# Patient Record
Sex: FEMALE | Race: WHITE | NOT HISPANIC OR LATINO | URBAN - METROPOLITAN AREA
[De-identification: names, ages, dates, MRNs, and addresses within clinical notes are randomized per-mention and may not be internally consistent; named-entity substitution may affect disease eponyms.]

---

## 2019-02-21 ENCOUNTER — EMERGENCY (EMERGENCY)
Facility: HOSPITAL | Age: 84
LOS: 1 days | Discharge: HOME | End: 2019-02-21
Attending: EMERGENCY MEDICINE
Payer: MEDICARE

## 2019-02-21 VITALS
RESPIRATION RATE: 16 BRPM | DIASTOLIC BLOOD PRESSURE: 98 MMHG | OXYGEN SATURATION: 98 % | SYSTOLIC BLOOD PRESSURE: 171 MMHG | HEART RATE: 74 BPM

## 2019-02-21 VITALS
HEIGHT: 62 IN | SYSTOLIC BLOOD PRESSURE: 146 MMHG | HEART RATE: 90 BPM | DIASTOLIC BLOOD PRESSURE: 86 MMHG | WEIGHT: 110.01 LBS | RESPIRATION RATE: 20 BRPM | OXYGEN SATURATION: 96 % | TEMPERATURE: 98 F

## 2019-02-21 VITALS
DIASTOLIC BLOOD PRESSURE: 102 MMHG | OXYGEN SATURATION: 99 % | HEART RATE: 79 BPM | SYSTOLIC BLOOD PRESSURE: 203 MMHG | RESPIRATION RATE: 15 BRPM

## 2019-02-21 DIAGNOSIS — S81.819D: ICD-10-CM

## 2019-02-21 DIAGNOSIS — I10 ESSENTIAL (PRIMARY) HYPERTENSION: ICD-10-CM

## 2019-02-21 DIAGNOSIS — G30.9 ALZHEIMER'S DISEASE, UNSPECIFIED: ICD-10-CM
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
## 2019-02-21 DIAGNOSIS — I48.2 CHRONIC ATRIAL FIBRILLATION: ICD-10-CM

## 2019-02-21 LAB
ALBUMIN SERPL ELPH-MCNC: 4.1 G/DL — SIGNIFICANT CHANGE UP (ref 3.3–5.2)
ALP SERPL-CCNC: 119 U/L — SIGNIFICANT CHANGE UP (ref 40–120)
ALT FLD-CCNC: 12 U/L — SIGNIFICANT CHANGE UP
ANION GAP SERPL CALC-SCNC: 13 MMOL/L — SIGNIFICANT CHANGE UP (ref 5–17)
APTT BLD: 34.5 SEC — SIGNIFICANT CHANGE UP (ref 27.5–36.3)
AST SERPL-CCNC: 21 U/L — SIGNIFICANT CHANGE UP
BASOPHILS # BLD AUTO: 0 K/UL — SIGNIFICANT CHANGE UP (ref 0–0.2)
BASOPHILS NFR BLD AUTO: 0.8 % — SIGNIFICANT CHANGE UP (ref 0–2)
BILIRUB SERPL-MCNC: 0.9 MG/DL — SIGNIFICANT CHANGE UP (ref 0.4–2)
BLD GP AB SCN SERPL QL: SIGNIFICANT CHANGE UP
BUN SERPL-MCNC: 23 MG/DL — HIGH (ref 8–20)
CALCIUM SERPL-MCNC: 9.4 MG/DL — SIGNIFICANT CHANGE UP (ref 8.6–10.2)
CHLORIDE SERPL-SCNC: 103 MMOL/L — SIGNIFICANT CHANGE UP (ref 98–107)
CO2 SERPL-SCNC: 26 MMOL/L — SIGNIFICANT CHANGE UP (ref 22–29)
CREAT SERPL-MCNC: 0.76 MG/DL — SIGNIFICANT CHANGE UP (ref 0.5–1.3)
EOSINOPHIL # BLD AUTO: 0.2 K/UL — SIGNIFICANT CHANGE UP (ref 0–0.5)
EOSINOPHIL NFR BLD AUTO: 2.5 % — SIGNIFICANT CHANGE UP (ref 0–6)
GLUCOSE SERPL-MCNC: 93 MG/DL — SIGNIFICANT CHANGE UP (ref 70–115)
HCT VFR BLD CALC: 43 % — SIGNIFICANT CHANGE UP (ref 37–47)
HGB BLD-MCNC: 14.6 G/DL — SIGNIFICANT CHANGE UP (ref 12–16)
INR BLD: 2.54 RATIO — HIGH (ref 0.88–1.16)
LIDOCAIN IGE QN: 67 U/L — HIGH (ref 22–51)
LYMPHOCYTES # BLD AUTO: 1.7 K/UL — SIGNIFICANT CHANGE UP (ref 1–4.8)
LYMPHOCYTES # BLD AUTO: 27.1 % — SIGNIFICANT CHANGE UP (ref 20–55)
MCHC RBC-ENTMCNC: 32.3 PG — HIGH (ref 27–31)
MCHC RBC-ENTMCNC: 34 G/DL — SIGNIFICANT CHANGE UP (ref 32–36)
MCV RBC AUTO: 95.1 FL — SIGNIFICANT CHANGE UP (ref 81–99)
MONOCYTES # BLD AUTO: 0.5 K/UL — SIGNIFICANT CHANGE UP (ref 0–0.8)
MONOCYTES NFR BLD AUTO: 7.8 % — SIGNIFICANT CHANGE UP (ref 3–10)
NEUTROPHILS # BLD AUTO: 3.9 K/UL — SIGNIFICANT CHANGE UP (ref 1.8–8)
NEUTROPHILS NFR BLD AUTO: 61.5 % — SIGNIFICANT CHANGE UP (ref 37–73)
PLATELET # BLD AUTO: 216 K/UL — SIGNIFICANT CHANGE UP (ref 150–400)
POTASSIUM SERPL-MCNC: 4 MMOL/L — SIGNIFICANT CHANGE UP (ref 3.5–5.3)
POTASSIUM SERPL-SCNC: 4 MMOL/L — SIGNIFICANT CHANGE UP (ref 3.5–5.3)
PROT SERPL-MCNC: 7.2 G/DL — SIGNIFICANT CHANGE UP (ref 6.6–8.7)
PROTHROM AB SERPL-ACNC: 30.1 SEC — HIGH (ref 10–12.9)
RBC # BLD: 4.52 M/UL — SIGNIFICANT CHANGE UP (ref 4.4–5.2)
RBC # FLD: 13.7 % — SIGNIFICANT CHANGE UP (ref 11–15.6)
SODIUM SERPL-SCNC: 142 MMOL/L — SIGNIFICANT CHANGE UP (ref 135–145)
TYPE + AB SCN PNL BLD: SIGNIFICANT CHANGE UP
WBC # BLD: 6.4 K/UL — SIGNIFICANT CHANGE UP (ref 4.8–10.8)
WBC # FLD AUTO: 6.4 K/UL — SIGNIFICANT CHANGE UP (ref 4.8–10.8)

## 2019-02-21 PROCEDURE — 71045 X-RAY EXAM CHEST 1 VIEW: CPT

## 2019-02-21 PROCEDURE — 72125 CT NECK SPINE W/O DYE: CPT | Mod: 26

## 2019-02-21 PROCEDURE — 70450 CT HEAD/BRAIN W/O DYE: CPT

## 2019-02-21 PROCEDURE — 99284 EMERGENCY DEPT VISIT MOD MDM: CPT

## 2019-02-21 PROCEDURE — 86901 BLOOD TYPING SEROLOGIC RH(D): CPT

## 2019-02-21 PROCEDURE — 72125 CT NECK SPINE W/O DYE: CPT

## 2019-02-21 PROCEDURE — 93010 ELECTROCARDIOGRAM REPORT: CPT

## 2019-02-21 PROCEDURE — 83605 ASSAY OF LACTIC ACID: CPT

## 2019-02-21 PROCEDURE — 76377 3D RENDER W/INTRP POSTPROCES: CPT | Mod: 26

## 2019-02-21 PROCEDURE — 72170 X-RAY EXAM OF PELVIS: CPT | Mod: 26

## 2019-02-21 PROCEDURE — 85610 PROTHROMBIN TIME: CPT

## 2019-02-21 PROCEDURE — 86900 BLOOD TYPING SEROLOGIC ABO: CPT

## 2019-02-21 PROCEDURE — 93005 ELECTROCARDIOGRAM TRACING: CPT

## 2019-02-21 PROCEDURE — 86850 RBC ANTIBODY SCREEN: CPT

## 2019-02-21 PROCEDURE — 71045 X-RAY EXAM CHEST 1 VIEW: CPT | Mod: 26

## 2019-02-21 PROCEDURE — 85730 THROMBOPLASTIN TIME PARTIAL: CPT

## 2019-02-21 PROCEDURE — 72192 CT PELVIS W/O DYE: CPT | Mod: 26

## 2019-02-21 PROCEDURE — 96374 THER/PROPH/DIAG INJ IV PUSH: CPT

## 2019-02-21 PROCEDURE — 72170 X-RAY EXAM OF PELVIS: CPT

## 2019-02-21 PROCEDURE — 80053 COMPREHEN METABOLIC PANEL: CPT

## 2019-02-21 PROCEDURE — 99285 EMERGENCY DEPT VISIT HI MDM: CPT | Mod: 25

## 2019-02-21 PROCEDURE — 83690 ASSAY OF LIPASE: CPT

## 2019-02-21 PROCEDURE — 70450 CT HEAD/BRAIN W/O DYE: CPT | Mod: 26

## 2019-02-21 PROCEDURE — 36415 COLL VENOUS BLD VENIPUNCTURE: CPT

## 2019-02-21 PROCEDURE — 85027 COMPLETE CBC AUTOMATED: CPT

## 2019-02-21 RX ORDER — WARFARIN SODIUM 2.5 MG/1
1 TABLET ORAL
Qty: 0 | Refills: 0 | COMMUNITY

## 2019-02-21 RX ORDER — LISINOPRIL 2.5 MG/1
1 TABLET ORAL
Qty: 0 | Refills: 0 | COMMUNITY

## 2019-02-21 RX ORDER — ACETAMINOPHEN 500 MG
1000 TABLET ORAL ONCE
Qty: 0 | Refills: 0 | Status: DISCONTINUED | OUTPATIENT
Start: 2019-02-21 | End: 2019-02-21

## 2019-02-21 RX ORDER — MAGNESIUM HYDROXIDE 400 MG/1
15 TABLET, CHEWABLE ORAL
Qty: 0 | Refills: 0 | COMMUNITY

## 2019-02-21 RX ORDER — ACETAMINOPHEN 500 MG
1000 TABLET ORAL ONCE
Qty: 0 | Refills: 0 | Status: COMPLETED | OUTPATIENT
Start: 2019-02-21 | End: 2019-02-21

## 2019-02-21 RX ORDER — ATENOLOL 25 MG/1
1 TABLET ORAL
Qty: 0 | Refills: 0 | COMMUNITY

## 2019-02-21 RX ORDER — DOCUSATE SODIUM 100 MG
1 CAPSULE ORAL
Qty: 0 | Refills: 0 | COMMUNITY

## 2019-02-21 RX ORDER — FUROSEMIDE 40 MG
1 TABLET ORAL
Qty: 0 | Refills: 0 | COMMUNITY

## 2019-02-21 RX ADMIN — Medication 400 MILLIGRAM(S): at 16:11

## 2019-02-21 NOTE — H&P ADULT - NSHPPHYSICALEXAM_GEN_ALL_CORE
Constitutional: Well-developed well nourished Female in no acute distress  HEENT:  R frontal forehead abrasion and swelling , maxillofacial structures stable, no barnes sign / racoon eyes, EOMI b/l, pupils 3 mm round and reactive to light b/l, abrasion to L nares  Neck: cervical collar in place, trachea midline  Respiratory: Breath sounds CTA b/l respirations are unlabored, no accessory muscle use, no conversational dyspnea  Cardiovascular: +S1, S1, Chest wall is non-tender to palpation, no subQ emphysema or crepitus palpated  Gastrointestinal: Abdomen soft, non-tender, non-distended, no rebound tenderness / guarding, no ecchymosis or external signs of abdominal trauma  Musculoskeletal: moving all extremities spontaneously, 55 motor strength of b/l Upper and lower extremities. abrasion to b/l knees, skin tear to R lateral extremity,   Pelvis: stable  Vascular: 2+ radial, femoral, and DP pulses b/l  Neurological: GCS: 14 (4/5/6), neurologically at baseline per facility  Neurospinal: no C/T/LS spine tenderness to palpation, no step-offs or signs of external trauma to the back

## 2019-02-21 NOTE — H&P ADULT - HISTORY OF PRESENT ILLNESS
89F w/PMH sig for alzheimer's and paroxysmal AFib on coumadin s/p mechanical fall on face at [facility]. Denies LOC. C/o R thumb pain upon arrival    PMH:   Alzheimer's disease  Endocarditis  CHF  Tuberculosis  Dementia  HTN  Paroxysmal AFib    A: Protected, patient conversating  B: CTAB. Symmetrical chest rise  C: 2+ central (femoral) & peripheral pulses (Radial, DP)  D: GCS 15, MAEO, interacting. No melisa disability noted  E: No gross deformities on primary exposure    Vitals:  HR: 70     BP:196/97      RR:18   SpO2: 99%    CXR: Negative for evidence of hemo/pneumothorax

## 2019-02-21 NOTE — H&P ADULT - ASSESSMENT
89yo F s/p fall on coumadin w/ resulting RLE skin tear that was repaired in ED. No hemodynamically significant bleed noted. Patient also w/ possible R femoral neck fracture. Although patient asymptomatic and able to ambulate, a CT pelvis is necessary to r/o fracture.       - Wound Care: Leave current dressing on for 2 days. May loosen ACE for comfort.  Do not remove steristrips.  After 2 days, dress wound with xeroform, dry gauze, and kerlix.   - Final dispo pending results of CT pelvis. 89yo F s/p fall on coumadin w/ resulting RLE skin tear that was repaired in ED neurovascularly intact without clinical or radiographic evidence of acute fracture  -CT Pelvis negative for acute fracture  - Wound Care: Leave current dressing on for 2 days. May loosen ACE for comfort.  Do not remove steristrips.  After 2 days, dress wound with xeroform, dry gauze, and kerlix.   - Clear for discharge back to [facility] from Trauma standpoint, dispo per ED

## 2019-02-21 NOTE — ED ADULT NURSE NOTE - OBJECTIVE STATEMENT
Pt presents with left leg laceration.  Pt seen here today as trauma and sent to [facility].  Pt's left lower extremity actively bleeding upon presentation, wrapped with ace bandage. Pt has eccymosis to right forehead/orbital area.  Pt agitated, trying to get out of bed. Deescalation techniques attempted multiple times, pt states she wants to leave.  Administered .5mg ativan for pt safety.  Awaiting trauma's recommendations.

## 2019-02-21 NOTE — ED PROVIDER NOTE - OBJECTIVE STATEMENT
89 y/o F with PMH HTN, dementia,  AFIb on coumadin p/w fall. Pt fell near elevator w/ ? LOC. pt does not remember falling. Level 2 trauma activated. Pt w/ GCS 14 , following commands moving all upper and lower extremities. Denies chest pain, abdominal pain. Pt had laceration on her right anterior shin, skin abrasion on bridge of nose, abrasion on forehead, hematoma on posterior scalp.    A: airway intact speaking full sentences  B: b/L breath sounds  C: b/P systolic 200 , equal pulses b/l   D: GC 14, C collar in place, moving extremities 89 y/o F with PMH HTN, dementia,  AFIb on coumadin p/w fall. Pt had witnessed fall near elevator w/ o LOC. pt does not remember falling. Level 2 trauma activated. Pt w/ GCS 14 , following commands moving all upper and lower extremities. Denies chest pain, abdominal pain. Pt had laceration on her right anterior shin, skin abrasion on bridge of nose, abrasion on forehead, hematoma on posterior scalp.  Per NH patient at current mental status baseline.  A: airway intact speaking full sentences  B: b/L breath sounds  C: b/P systolic 200 , equal pulses b/l   D: GC 14, C collar in place, moving extremities

## 2019-02-21 NOTE — ED PROVIDER NOTE - ATTENDING CONTRIBUTION TO CARE
Dr. Castillo: I have personally seen and examined this patient at the bedside. I have fully participated in the care of this patient. I have reviewed all pertinent clinical information, including history, physical exam, plan and the Resident's note and agree except as noted. HPI above as by me. PE above as by me.

## 2019-02-21 NOTE — ED PROVIDER NOTE - CLINICAL SUMMARY MEDICAL DECISION MAKING FREE TEXT BOX
Will contact trauma team to evaluate pt to assess laceration, and reeval Will contact trauma team to evaluate pt to assess laceration, and re-eval

## 2019-02-21 NOTE — H&P ADULT - ATTENDING COMMENTS
Same level fall.  + head trauma.  + warfarin    Primary survey intact except for GCS 14 for mild disorientation (reported baseline)    Secondary with abrasion contusion to head.  Deep full thickness abrasion to anterior right leg.      Risk for ICH.  Fails [place] C-spine clearance.  CT imaging obtained.  No acute injury.      Local wound care for leg.  Tertiary exam and indicated imaging.

## 2019-02-21 NOTE — PROVIDER CONTACT NOTE (OTHER) - ACTION/TREATMENT ORDERED:
PT to hold evaluation until xrays are read or MD clears pt for ambulation prior to Xray. MD contacted, awaiting return call.

## 2019-02-21 NOTE — H&P ADULT - HISTORY OF PRESENT ILLNESS
89F w/PMH sig for alzheimer's and paroxysmal AFib on coumadin s/p mechanical fall on face at [facility]. Denies LOC. Was seen in ED this afternoon, where a RLE skin tear was repaired w/ Steri-strips and pt cleared for discharge back to her facility. She returns now w/ complaint of RLE oozing. Prior to d/c patient had pelvic XR that was read as having a right femoral neck fracture. She had shown ability to ambulate, denied R hip pain, and had full strength in BLE prior to being d/c'd. No known history of prior femur fracture.     A: Protected, patient conversing  B: CTAB. Symmetrical chest rise  C: 2+ central (femoral) & peripheral pulses (Radial, DP)  D: GCS 15, MAEO, interacting. No melisa disability noted  E: RLE w/ skin tear that is s/p repair w/ steristrips    Vitals:  T(F): 98 (02-21-19 @ 19:55)  HR: 90 (02-21-19 @ 19:55)  BP: 146/86 (02-21-19 @ 19:55)  BP(mean): --  RR: 20 (02-21-19 @ 19:55)  SpO2: 96% (02-21-19 @ 19:55)HR: 70     BP:196/97      RR:18   SpO2: 99%      PMH:   Alzheimer's disease  Endocarditis  CHF  Tuberculosis  Dementia  HTN  Paroxysmal AFib    PSH:   No significant past surgical history    Home Medications:  atenolol 100 mg oral tablet: 1 tab(s) orally once a day (21 Feb 2019 15:21)  Coumadin 3 mg oral tablet: 1 tab(s) orally once a day (21 Feb 2019 15:21)  Dulcolax Stool Softener: 1 cap(s) rectal once a day (21 Feb 2019 15:21)  Lasix 20 mg oral tablet: 1 tab(s) orally once a day (21 Feb 2019 15:21)  lisinopril 2.5 mg oral tablet: 1 tab(s) orally once a day (21 Feb 2019 15:21)  Milk of Magnesia 8% oral suspension: 15 milliliter(s) orally once a day (at bedtime) (21 Feb 2019 15:21)    Allergies  lactose (Unknown)  penicillin (Unknown)    SOC Hx:  denies etoh, tobacco, or drug use    FMH:  No significant family history.     Physical Exam:   Constitutional: Well-developed, NAD  HEENT:  R frontal forehead, abrasion/contusion, maxillofacial structures stable, no barnes sign / racoon eyes, EOMI b/l, pupils 3 mm round and reactive to light b/l, Small abrasion to Nasal bridge.   Neck: trachea midline  Respiratory: Breath sounds CTA b/l respirations are unlabored, no accessory muscle use, no conversational dyspnea  Cardiovascular: +S1, S1, Chest wall is non-tender to palpation, no subQ emphysema or crepitus palpated  Gastrointestinal: Abdomen soft, non-tender, non-distended, no rebound tenderness / guarding, no ecchymosis or external signs of abdominal trauma  Musculoskeletal: moving all extremities spontaneously, 5/5 motor strength of b/l Upper and lower extremities. RLE w/ full ROM. Small abrasion to b/l knees, skin tear to RLE shin repaired w/ steristrips.  No active bleeding noted.   Pelvis: stable  Vascular: 2+ radial, femoral, and DP pulses b/l  Neurospinal: no C/T/LS spine tenderness to palpation, no step-offs

## 2019-02-21 NOTE — ED PROVIDER NOTE - PHYSICAL EXAMINATION
superficial skin tear w/ laceration on right shin, abrasions on forehead , bridge of nose,  Hematoma on anterior scalp andposterior

## 2019-02-21 NOTE — ED PROVIDER NOTE - MUSCULOSKELETAL, MLM
Spine appears normal, no midline tenderness. skin tear to right anterior lower leg, with tape and dressing with active oozing blood

## 2019-02-21 NOTE — H&P ADULT - NSHPPHYSICALEXAM_GEN_ALL_CORE
Vital Signs Last 24 Hrs  T(C): 36.7 (21 Feb 2019 19:55), Max: 36.7 (21 Feb 2019 19:55)  T(F): 98 (21 Feb 2019 19:55), Max: 98 (21 Feb 2019 19:55)  HR: 90 (21 Feb 2019 19:55) (74 - 90)  BP: 146/86 (21 Feb 2019 19:55) (146/86 - 203/102)  BP(mean): --  RR: 20 (21 Feb 2019 19:55) (15 - 20)  SpO2: 96% (21 Feb 2019 19:55) (96% - 99%)    Constitutional: Well-developed well nourished Female in no acute distress  HEENT:  R frontal forehead abrasion and swelling , maxillofacial structures stable, no barnes sign / racoon eyes, EOMI b/l, pupils 3 mm round and reactive to light b/l, abrasion to L nares  Neck: cervical collar in place, trachea midline  Respiratory: Breath sounds CTA b/l respirations are unlabored, no accessory muscle use, no conversational dyspnea  Cardiovascular: +S1, S1, Chest wall is non-tender to palpation, no subQ emphysema or crepitus palpated  Gastrointestinal: Abdomen soft, non-tender, non-distended, no rebound tenderness / guarding, no ecchymosis or external signs of abdominal trauma  Musculoskeletal: moving all extremities spontaneously, 55 motor strength of b/l Upper and lower extremities. abrasion to b/l knees, skin tear to R lateral extremity,  Pelvis: stable, NTTP  Vascular: 2+ radial, femoral, and DP pulses b/l  Neurological: GCS: 14 (4/5/6), neurologically at baseline per facility  Neurospinal: no C/T/LS spine tenderness to palpation, no step-offs or signs of external trauma to the back

## 2019-02-21 NOTE — ED ADULT NURSE NOTE - CHIEF COMPLAINT QUOTE
Patient sent back from [facility], with actively bleeding laceration to right LE, patient was discharged this afternoon from [facility], patient is no acute distress, denies pain,  on Coumadin.,

## 2019-02-21 NOTE — ED PROVIDER NOTE - PROGRESS NOTE DETAILS
Pt able to ambulate, denies pain w/ walking. Daughter at bedside, states pt walks without walker. Pt xray read pelvis showing right femoral neck fracture recommending CT scan , pt returned to ED in A-06 . Dr. verma communicated regarding result of  femur fx

## 2019-02-21 NOTE — H&P ADULT - ASSESSMENT
89F w/AFib on coumadin and alzheimer's s/p mechanical fall at assisted living facility ([facility]) onto face, -LOC    Plan:  -CT head/c-spine  -f/u labs  -repair of lacerations 89F w/AFib on coumadin and alzheimer's s/p mechanical fall at assisted living facility ([facility]) onto face, -LOC    CT head and C-spine negative. INR 2.5. Patient's RLE skin tear was washed out and dressed    Plan:  -dispo per ED

## 2019-02-21 NOTE — ED PROVIDER NOTE - CLINICAL SUMMARY MEDICAL DECISION MAKING FREE TEXT BOX
89 y/o F with PMH HTN, dementia,  AFIb on coumadin p/w fall. level 2 trauma, ct head, c spine, cxr, cbc, cmp, pt/inr, ua, trauma assessment, ekg , tetanus

## 2019-02-21 NOTE — H&P ADULT - NSHPREVIEWOFSYSTEMS_GEN_ALL_CORE
General: denies malaise, fatigue, anorexia, weight loss, fever, chills  Neuro: Denies change in vision, numbness, weakness  Resp: denies sob, cough,   CVS: denies chest pain, palpitations, SHAH  GI: Denies  nausesea, vomiting, bloody stool, constipation, diarrhea  : Denies dysuria  MSK: Denies joint pain,   Skin: Denies rashes  Complete 12 system ROS performed, negative except as mentioned above or in HPI

## 2019-02-21 NOTE — ED PROVIDER NOTE - PROGRESS NOTE DETAILS
Spoke to trauma team, who will evaluate pt in the ED Trauma reevaluated pt in the ED, who placed a bulky dressing on wound.  Pt stable for d/c back to the nursing home Official reads noted on plain film xray of pelvis ordered during previous visit, femoral neck fracture noted discussed with trauma team, who will come evaluate pt in the ED.  CT samaria pelvis ordered, will have Ortho evaluate CT bony pelvis neg as per Radiology.  Case d/w Trauma and pt to be dc'd back to [facility]

## 2019-02-21 NOTE — ED PROVIDER NOTE - ENMT, MLM
Moist mucous membranes, abrasions on forehead and bridge of nose,  Hematoma on anterior and posterior scalp

## 2019-02-21 NOTE — ED ADULT NURSE NOTE - NSIMPLEMENTINTERV_GEN_ALL_ED
Implemented All Fall with Harm Risk Interventions:  [place] to call system. Call bell, personal items and telephone within reach. Instruct patient to call for assistance. Room bathroom lighting operational. Non-slip footwear when patient is off stretcher. Physically safe environment: no spills, clutter or unnecessary equipment. Stretcher in lowest position, wheels locked, appropriate side rails in place. Provide visual cue, wrist band, yellow gown, etc. Monitor gait and stability. Monitor for mental status changes and reorient to person, place, and time. Review medications for side effects contributing to fall risk. Reinforce activity limits and safety measures with patient and family. Provide visual clues: red socks.

## 2019-02-21 NOTE — H&P ADULT - ATTENDING COMMENTS
Seen and examined.  89 yo female w/ Alzheimer's and afib on Coumadin returns from nursing home due to concerns of bleeding from lower extremity abrasion.  Patient was a trauma activation B from earlier this morning and sustained an abrasion to RLE.  On return a PXR was noted to have an impacted right femoral neck fracture.  Of note, patient had ambulated without issue prior to her return to her nursing home.  No acute clinical changes and RLE dressing is intact and clean.  CT pelvis done and shows no fracture.  No new acute traumatic issues.  May return to nursing home.  Dressing change as described above.

## 2019-02-21 NOTE — ED ADULT TRIAGE NOTE - CHIEF COMPLAINT QUOTE
Patient sent back from [facility], with actively bleeding laceration, patient was discharged this afternoon, patient is no acute distress, denies pain, patient on Coumadin., Patient sent back from [facility], with actively bleeding laceration to right LE, patient was discharged this afternoon from [facility], patient is no acute distress, denies pain,  on Coumadin.,

## 2019-02-21 NOTE — ED PROVIDER NOTE - OBJECTIVE STATEMENT
89 y/o F, with hx of Afib, HTN, and dementia, on Coumadin, presents to the ED c/o persistently bleeding laceration to her RLE, onset today.  As per EMS, pt was seen in the ED this afternoon as a Code Trauma B s/p witnessed fall, and was d/c from [facility] ED at 7:00PM.  Pt was returning to [facility] and was returned to the ED due to persistent bleeding.  Pt with no physical complaints at this time.  HPI provided by EMS.  HPI limited secondary to pt's hx of dementia

## 2019-02-22 VITALS
RESPIRATION RATE: 15 BRPM | OXYGEN SATURATION: 97 % | TEMPERATURE: 97 F | SYSTOLIC BLOOD PRESSURE: 159 MMHG | DIASTOLIC BLOOD PRESSURE: 80 MMHG | HEART RATE: 70 BPM

## 2019-02-22 PROCEDURE — 96374 THER/PROPH/DIAG INJ IV PUSH: CPT

## 2019-02-22 PROCEDURE — 76377 3D RENDER W/INTRP POSTPROCES: CPT

## 2019-02-22 PROCEDURE — 99284 EMERGENCY DEPT VISIT MOD MDM: CPT | Mod: 25

## 2019-02-22 PROCEDURE — 72192 CT PELVIS W/O DYE: CPT

## 2019-02-22 RX ADMIN — Medication 0.5 MILLIGRAM(S): at 00:01

## 2019-02-22 NOTE — ED ADULT NURSE REASSESSMENT NOTE - NS ED NURSE REASSESS COMMENT FT1
Report called to Johanny BADILLO at [facility].  Pt transported via [facility].  all vitals stable.

## 2024-07-15 NOTE — ED ADULT NURSE NOTE - CAS TRG GENERAL AIRWAY, MLM
"Chief Complaint   Patient presents with    Derm Problem     Both legs, left is worse   Patient presents today with rash on both legs (worse on L leg), it is very itchy, she also complains of exhaustion that has worsened since the rash appeared. She said she found a tick on her stomach the other day, wondering if this has anything to do with it.             Initial BP (!) 146/93 (BP Location: Left arm, Patient Position: Sitting, Cuff Size: Adult Regular)   Pulse 97   Temp 99.3  F (37.4  C) (Tympanic)   Resp 14   Ht 1.6 m (5' 3\")   Wt 104 kg (229 lb 3.2 oz)   LMP 07/03/2024 (Approximate)   SpO2 96%   BMI 40.60 kg/m   Estimated body mass index is 40.6 kg/m  as calculated from the following:    Height as of this encounter: 1.6 m (5' 3\").    Weight as of this encounter: 104 kg (229 lb 3.2 oz).     FOOD SECURITY SCREENING QUESTIONS:    The next two questions are to help us understand your food security.  If you are feeling you need any assistance in this area, we have resources available to support you today.    Hunger Vital Signs:  Within the past 12 months we worried whether our food would run out before we got money to buy more. Never  Within the past 12 months the food we bought just didn't last and we didn't have money to get more. Never      Edward Puente    "
Patent